# Patient Record
Sex: FEMALE | Race: WHITE | NOT HISPANIC OR LATINO | Employment: FULL TIME | URBAN - METROPOLITAN AREA
[De-identification: names, ages, dates, MRNs, and addresses within clinical notes are randomized per-mention and may not be internally consistent; named-entity substitution may affect disease eponyms.]

---

## 2023-08-08 ENCOUNTER — OFFICE VISIT (OUTPATIENT)
Dept: MEDICAL GROUP | Facility: PHYSICIAN GROUP | Age: 33
End: 2023-08-08
Payer: COMMERCIAL

## 2023-08-08 ENCOUNTER — HOSPITAL ENCOUNTER (OUTPATIENT)
Facility: MEDICAL CENTER | Age: 33
End: 2023-08-08
Payer: COMMERCIAL

## 2023-08-08 VITALS
HEIGHT: 68 IN | WEIGHT: 181 LBS | TEMPERATURE: 98 F | OXYGEN SATURATION: 97 % | BODY MASS INDEX: 27.43 KG/M2 | HEART RATE: 98 BPM | SYSTOLIC BLOOD PRESSURE: 112 MMHG | RESPIRATION RATE: 16 BRPM | DIASTOLIC BLOOD PRESSURE: 74 MMHG

## 2023-08-08 DIAGNOSIS — Z11.3 SCREENING EXAMINATION FOR SEXUALLY TRANSMITTED DISEASE: ICD-10-CM

## 2023-08-08 DIAGNOSIS — Z23 NEED FOR VACCINATION: ICD-10-CM

## 2023-08-08 DIAGNOSIS — Z83.3 FAMILY HISTORY OF DIABETES MELLITUS (DM): ICD-10-CM

## 2023-08-08 DIAGNOSIS — Z78.9 VEGAN DIET: ICD-10-CM

## 2023-08-08 DIAGNOSIS — Z13.0 SCREENING FOR DEFICIENCY ANEMIA: ICD-10-CM

## 2023-08-08 DIAGNOSIS — Z13.6 SCREENING FOR CARDIOVASCULAR CONDITION: ICD-10-CM

## 2023-08-08 DIAGNOSIS — E55.9 VITAMIN D DEFICIENCY: ICD-10-CM

## 2023-08-08 DIAGNOSIS — Z13.29 THYROID DISORDER SCREEN: ICD-10-CM

## 2023-08-08 DIAGNOSIS — Z00.00 WELLNESS EXAMINATION: ICD-10-CM

## 2023-08-08 DIAGNOSIS — F33.0 MILD EPISODE OF RECURRENT MAJOR DEPRESSIVE DISORDER (HCC): ICD-10-CM

## 2023-08-08 PROCEDURE — 90471 IMMUNIZATION ADMIN: CPT

## 2023-08-08 PROCEDURE — 99385 PREV VISIT NEW AGE 18-39: CPT | Mod: 25

## 2023-08-08 PROCEDURE — 90472 IMMUNIZATION ADMIN EACH ADD: CPT

## 2023-08-08 PROCEDURE — 3078F DIAST BP <80 MM HG: CPT

## 2023-08-08 PROCEDURE — 90715 TDAP VACCINE 7 YRS/> IM: CPT

## 2023-08-08 PROCEDURE — 87491 CHLMYD TRACH DNA AMP PROBE: CPT

## 2023-08-08 PROCEDURE — 3074F SYST BP LT 130 MM HG: CPT

## 2023-08-08 PROCEDURE — 90746 HEPB VACCINE 3 DOSE ADULT IM: CPT

## 2023-08-08 PROCEDURE — 87591 N.GONORRHOEAE DNA AMP PROB: CPT

## 2023-08-08 ASSESSMENT — PATIENT HEALTH QUESTIONNAIRE - PHQ9
4. FEELING TIRED OR HAVING LITTLE ENERGY: SEVERAL DAYS
5. POOR APPETITE OR OVEREATING: NOT AT ALL
2. FEELING DOWN, DEPRESSED, IRRITABLE, OR HOPELESS: NOT AT ALL
8. MOVING OR SPEAKING SO SLOWLY THAT OTHER PEOPLE COULD HAVE NOTICED. OR THE OPPOSITE, BEING SO FIGETY OR RESTLESS THAT YOU HAVE BEEN MOVING AROUND A LOT MORE THAN USUAL: NOT AT ALL
SUM OF ALL RESPONSES TO PHQ9 QUESTIONS 1 AND 2: 0
SUM OF ALL RESPONSES TO PHQ QUESTIONS 1-9: 4
9. THOUGHTS THAT YOU WOULD BE BETTER OFF DEAD, OR OF HURTING YOURSELF: NOT AT ALL
6. FEELING BAD ABOUT YOURSELF - OR THAT YOU ARE A FAILURE OR HAVE LET YOURSELF OR YOUR FAMILY DOWN: SEVERAL DAYS
1. LITTLE INTEREST OR PLEASURE IN DOING THINGS: NOT AT ALL
3. TROUBLE FALLING OR STAYING ASLEEP OR SLEEPING TOO MUCH: SEVERAL DAYS
CLINICAL INTERPRETATION OF PHQ2 SCORE: 0
7. TROUBLE CONCENTRATING ON THINGS, SUCH AS READING THE NEWSPAPER OR WATCHING TELEVISION: SEVERAL DAYS

## 2023-08-08 ASSESSMENT — ENCOUNTER SYMPTOMS: DEPRESSION: 1

## 2023-08-08 NOTE — PROGRESS NOTES
"Subjective:     CC: Diagnoses of Wellness examination, Mild episode of recurrent major depressive disorder (HCC), Vegan diet, Thyroid disorder screen, Vitamin D deficiency, Screening for cardiovascular condition, Screening examination for sexually transmitted disease, Screening for deficiency anemia, Family history of diabetes mellitus (DM), and Need for vaccination were pertinent to this visit.    Pt presents today to establish care with me.  She is generally feeling well no health concerns. She was kindly referred to me by her friend. Has not seen a PCP since moving to [place] in 2019.  She is single/dating; working full time at  [employer]    HPI:   Lisa presents today with    Problem   Mild Episode of Recurrent Major Depressive Disorder (Hcc)       Health Maintenance: Completed  Cancer screening:   Cervical cancer screening: will follow up     Infectious disease screening/Immunizaitons  -STI screenin23  Practices safe sex.  -HIV Screenin23  -Immunizaitons:   Influenza: recommend annually  Tetanus: up-to-date: 23  Anticipatory Guidance:  Elicits she is eating a variety of fresh veggies/fruits, No meats,   2x weekly fast food/junk food  Soda: none; water: 2L+  Exercise: recommended 150 minutes/week: walking 20 min daily; hiking once weekly.   Substance Abuse: no  Safe in relationship. Yes/so  Seat belts, bike helmet, gun safety discussed.  Sun protection used.    ROS:  Review of Systems   Psychiatric/Behavioral:  Positive for depression (mild). Negative for suicidal ideas.    All other systems reviewed and are negative.      Objective:     Exam:  /74 (BP Location: Right arm, Patient Position: Sitting, BP Cuff Size: Small adult)   Pulse 98   Temp 36.7 °C (98 °F) (Temporal)   Resp 16   Ht 1.72 m (5' 7.72\")   Wt 82.1 kg (181 lb)   LMP 2023 (Approximate)   SpO2 97%   BMI 27.75 kg/m²  Body mass index is 27.75 kg/m².    Physical Exam  Vitals reviewed.   Constitutional:       " General: She is not in acute distress.     Appearance: Normal appearance. She is well-groomed. She is not ill-appearing.   HENT:      Head: Normocephalic and atraumatic.      Right Ear: Tympanic membrane, ear canal and external ear normal.      Left Ear: Tympanic membrane, ear canal and external ear normal.      Nose: Nose normal.      Mouth/Throat:      Mouth: Mucous membranes are moist.      Pharynx: Oropharynx is clear.   Eyes:      Extraocular Movements: Extraocular movements intact.      Conjunctiva/sclera: Conjunctivae normal.      Pupils: Pupils are equal, round, and reactive to light.   Neck:      Thyroid: No thyromegaly.      Trachea: Trachea normal.   Cardiovascular:      Rate and Rhythm: Normal rate and regular rhythm.      Pulses: Normal pulses.      Heart sounds: Normal heart sounds. No murmur heard.  Pulmonary:      Effort: Pulmonary effort is normal. No respiratory distress.      Breath sounds: Normal breath sounds. No wheezing.   Abdominal:      General: Bowel sounds are normal.   Musculoskeletal:         General: No swelling, tenderness or deformity. Normal range of motion.      Cervical back: Full passive range of motion without pain.   Lymphadenopathy:      Cervical: No cervical adenopathy.   Skin:     General: Skin is warm and dry.      Capillary Refill: Capillary refill takes less than 2 seconds.   Neurological:      General: No focal deficit present.      Mental Status: She is alert and oriented to person, place, and time. Mental status is at baseline.      Cranial Nerves: No cranial nerve deficit.      Sensory: No sensory deficit.      Motor: No weakness.   Psychiatric:         Mood and Affect: Mood normal.         Behavior: Behavior normal.       Assessment & Plan:     33 y.o. female with the following -     Problem List Items Addressed This Visit       Mild episode of recurrent major depressive disorder (HCC)     Chronic, stable. Seeing psychiatry for this. PTSD/Anxiety/depression.    Depression Screening    Little interest or pleasure in doing things?  0 - not at all  Feeling down, depressed , or hopeless? 0 - not at all  Patient Health Questionnaire Score: 0  Interpretation of PHQ-9 Total Score   Score Severity   1-4 Minimal Depression               Other Visit Diagnoses       Wellness examination        Vegan diet        Relevant Orders    IRON/TOTAL IRON BIND    FERRITIN    VITAMIN B12    Thyroid disorder screen        Relevant Orders    TSH    Vitamin D deficiency        Relevant Orders    VITAMIN D,25 HYDROXY (DEFICIENCY)    Screening for cardiovascular condition        Relevant Orders    Comp Metabolic Panel    Lipid Profile    Screening examination for sexually transmitted disease        Relevant Orders    Chlamydia/GC, PCR (Genital/Anal swab)    HIV AG/AB Combo Assay Screening    T.Pallidum AB ABBY (Screening)    Trichomonas Vaginalis by TMA    Hepatitis C Virus Antibody    HEP B Surface Antibody    Hep B Core AB Total    Hep B Surface Antigen    Screening for deficiency anemia        Relevant Orders    CBC WITHOUT DIFFERENTIAL    Family history of diabetes mellitus (DM)        Relevant Orders    HEMOGLOBIN A1C    Need for vaccination        Relevant Orders    Hepatitis B Vaccine Adult 20+    Tdap Vaccine =>8YO IM          Supportive care, differential diagnoses, and indications for immediate follow-up discussed with patient.    Pathogenesis of diagnosis discussed including typical length and natural progression.    Instructed to return to clinic or nearest emergency department for any change in condition, further concerns, or worsening of symptoms.  Patient states understanding of the plan of care and discharge instructions.    Return in about 4 weeks (around 9/5/2023) for Labs, PAP, vaccines.    Please note that this dictation was created using voice recognition software. I have made every reasonable attempt to correct obvious errors, but I expect that there are errors of grammar and  possibly content that I did not discover before finalizing the note.

## 2023-08-08 NOTE — ASSESSMENT & PLAN NOTE
Chronic, stable. Seeing psychiatry for this. PTSD/Anxiety/depression.   Depression Screening    Little interest or pleasure in doing things?  0 - not at all  Feeling down, depressed , or hopeless? 0 - not at all  Patient Health Questionnaire Score: 0  Interpretation of PHQ-9 Total Score   Score Severity   1-4 Minimal Depression

## 2023-08-09 LAB
C TRACH DNA GENITAL QL NAA+PROBE: NEGATIVE
N GONORRHOEA DNA GENITAL QL NAA+PROBE: NEGATIVE
SPECIMEN SOURCE: NORMAL

## 2023-08-30 ENCOUNTER — HOSPITAL ENCOUNTER (OUTPATIENT)
Dept: LAB | Facility: MEDICAL CENTER | Age: 33
End: 2023-08-30
Payer: COMMERCIAL

## 2023-08-30 DIAGNOSIS — Z78.9 VEGAN DIET: ICD-10-CM

## 2023-08-30 DIAGNOSIS — E55.9 VITAMIN D DEFICIENCY: ICD-10-CM

## 2023-08-30 DIAGNOSIS — Z13.29 THYROID DISORDER SCREEN: ICD-10-CM

## 2023-08-30 DIAGNOSIS — Z11.3 SCREENING EXAMINATION FOR SEXUALLY TRANSMITTED DISEASE: ICD-10-CM

## 2023-08-30 DIAGNOSIS — Z13.6 SCREENING FOR CARDIOVASCULAR CONDITION: ICD-10-CM

## 2023-08-30 DIAGNOSIS — Z13.0 SCREENING FOR DEFICIENCY ANEMIA: ICD-10-CM

## 2023-08-30 DIAGNOSIS — Z83.3 FAMILY HISTORY OF DIABETES MELLITUS (DM): ICD-10-CM

## 2023-08-30 LAB
25(OH)D3 SERPL-MCNC: 37 NG/ML (ref 30–100)
ALBUMIN SERPL BCP-MCNC: 4.5 G/DL (ref 3.2–4.9)
ALBUMIN/GLOB SERPL: 1.5 G/DL
ALP SERPL-CCNC: 86 U/L (ref 30–99)
ALT SERPL-CCNC: 12 U/L (ref 2–50)
ANION GAP SERPL CALC-SCNC: 12 MMOL/L (ref 7–16)
AST SERPL-CCNC: 23 U/L (ref 12–45)
BILIRUB SERPL-MCNC: 0.5 MG/DL (ref 0.1–1.5)
BUN SERPL-MCNC: 8 MG/DL (ref 8–22)
CALCIUM ALBUM COR SERPL-MCNC: 9.4 MG/DL (ref 8.5–10.5)
CALCIUM SERPL-MCNC: 9.8 MG/DL (ref 8.5–10.5)
CHLORIDE SERPL-SCNC: 103 MMOL/L (ref 96–112)
CHOLEST SERPL-MCNC: 127 MG/DL (ref 100–199)
CO2 SERPL-SCNC: 21 MMOL/L (ref 20–33)
CREAT SERPL-MCNC: 0.63 MG/DL (ref 0.5–1.4)
ERYTHROCYTE [DISTWIDTH] IN BLOOD BY AUTOMATED COUNT: 44.5 FL (ref 35.9–50)
EST. AVERAGE GLUCOSE BLD GHB EST-MCNC: 103 MG/DL
FERRITIN SERPL-MCNC: 39.1 NG/ML (ref 10–291)
GFR SERPLBLD CREATININE-BSD FMLA CKD-EPI: 120 ML/MIN/1.73 M 2
GLOBULIN SER CALC-MCNC: 3 G/DL (ref 1.9–3.5)
GLUCOSE SERPL-MCNC: 83 MG/DL (ref 65–99)
HBA1C MFR BLD: 5.2 % (ref 4–5.6)
HBV CORE AB SERPL QL IA: NONREACTIVE
HBV SURFACE AB SERPL IA-ACNC: ABNORMAL MIU/ML (ref 0–10)
HBV SURFACE AG SER QL: NORMAL
HCT VFR BLD AUTO: 45.2 % (ref 37–47)
HCV AB SER QL: NORMAL
HDLC SERPL-MCNC: 45 MG/DL
HGB BLD-MCNC: 14.5 G/DL (ref 12–16)
HIV 1+2 AB+HIV1 P24 AG SERPL QL IA: NORMAL
IRON SATN MFR SERPL: 31 % (ref 15–55)
IRON SERPL-MCNC: 97 UG/DL (ref 40–170)
LDLC SERPL CALC-MCNC: 61 MG/DL
MCH RBC QN AUTO: 31.3 PG (ref 27–33)
MCHC RBC AUTO-ENTMCNC: 32.1 G/DL (ref 32.2–35.5)
MCV RBC AUTO: 97.4 FL (ref 81.4–97.8)
PLATELET # BLD AUTO: 243 K/UL (ref 164–446)
PMV BLD AUTO: 10.4 FL (ref 9–12.9)
POTASSIUM SERPL-SCNC: 3.8 MMOL/L (ref 3.6–5.5)
PROT SERPL-MCNC: 7.5 G/DL (ref 6–8.2)
RBC # BLD AUTO: 4.64 M/UL (ref 4.2–5.4)
SODIUM SERPL-SCNC: 136 MMOL/L (ref 135–145)
T PALLIDUM AB SER QL IA: NORMAL
TIBC SERPL-MCNC: 314 UG/DL (ref 250–450)
TRIGL SERPL-MCNC: 104 MG/DL (ref 0–149)
TSH SERPL DL<=0.005 MIU/L-ACNC: 2.33 UIU/ML (ref 0.38–5.33)
UIBC SERPL-MCNC: 217 UG/DL (ref 110–370)
VIT B12 SERPL-MCNC: 2475 PG/ML (ref 211–911)
WBC # BLD AUTO: 7.6 K/UL (ref 4.8–10.8)

## 2023-08-30 PROCEDURE — 83036 HEMOGLOBIN GLYCOSYLATED A1C: CPT

## 2023-08-30 PROCEDURE — 82607 VITAMIN B-12: CPT

## 2023-08-30 PROCEDURE — 86803 HEPATITIS C AB TEST: CPT

## 2023-08-30 PROCEDURE — 82728 ASSAY OF FERRITIN: CPT

## 2023-08-30 PROCEDURE — 82306 VITAMIN D 25 HYDROXY: CPT

## 2023-08-30 PROCEDURE — 87340 HEPATITIS B SURFACE AG IA: CPT

## 2023-08-30 PROCEDURE — 87389 HIV-1 AG W/HIV-1&-2 AB AG IA: CPT

## 2023-08-30 PROCEDURE — 83540 ASSAY OF IRON: CPT

## 2023-08-30 PROCEDURE — 83550 IRON BINDING TEST: CPT

## 2023-08-30 PROCEDURE — 85027 COMPLETE CBC AUTOMATED: CPT

## 2023-08-30 PROCEDURE — 86780 TREPONEMA PALLIDUM: CPT

## 2023-08-30 PROCEDURE — 86706 HEP B SURFACE ANTIBODY: CPT

## 2023-08-30 PROCEDURE — 86704 HEP B CORE ANTIBODY TOTAL: CPT

## 2023-08-30 PROCEDURE — 80061 LIPID PANEL: CPT

## 2023-08-30 PROCEDURE — 80053 COMPREHEN METABOLIC PANEL: CPT

## 2023-08-30 PROCEDURE — 84443 ASSAY THYROID STIM HORMONE: CPT

## 2023-08-30 PROCEDURE — 36415 COLL VENOUS BLD VENIPUNCTURE: CPT

## 2023-09-06 ENCOUNTER — OFFICE VISIT (OUTPATIENT)
Dept: MEDICAL GROUP | Facility: PHYSICIAN GROUP | Age: 33
End: 2023-09-06
Payer: COMMERCIAL

## 2023-09-06 ENCOUNTER — HOSPITAL ENCOUNTER (OUTPATIENT)
Facility: MEDICAL CENTER | Age: 33
End: 2023-09-06
Payer: COMMERCIAL

## 2023-09-06 VITALS
OXYGEN SATURATION: 99 % | BODY MASS INDEX: 27.74 KG/M2 | HEART RATE: 82 BPM | HEIGHT: 68 IN | DIASTOLIC BLOOD PRESSURE: 74 MMHG | TEMPERATURE: 98.5 F | WEIGHT: 183 LBS | SYSTOLIC BLOOD PRESSURE: 110 MMHG

## 2023-09-06 DIAGNOSIS — Z11.51 SCREENING FOR HPV (HUMAN PAPILLOMAVIRUS): ICD-10-CM

## 2023-09-06 DIAGNOSIS — Z12.4 SCREENING FOR CERVICAL CANCER: ICD-10-CM

## 2023-09-06 DIAGNOSIS — Z01.419 ENCOUNTER FOR GYNECOLOGICAL EXAMINATION: ICD-10-CM

## 2023-09-06 DIAGNOSIS — Z23 NEED FOR VACCINATION: ICD-10-CM

## 2023-09-06 DIAGNOSIS — N89.8 VAGINAL LESION: ICD-10-CM

## 2023-09-06 PROCEDURE — 99395 PREV VISIT EST AGE 18-39: CPT | Mod: 25

## 2023-09-06 PROCEDURE — 3078F DIAST BP <80 MM HG: CPT

## 2023-09-06 PROCEDURE — 87798 DETECT AGENT NOS DNA AMP: CPT

## 2023-09-06 PROCEDURE — 87625 HPV TYPES 16 & 18 ONLY: CPT

## 2023-09-06 PROCEDURE — 90471 IMMUNIZATION ADMIN: CPT

## 2023-09-06 PROCEDURE — 3074F SYST BP LT 130 MM HG: CPT

## 2023-09-06 PROCEDURE — 87624 HPV HI-RISK TYP POOLED RSLT: CPT

## 2023-09-06 PROCEDURE — 90746 HEPB VACCINE 3 DOSE ADULT IM: CPT

## 2023-09-06 PROCEDURE — 88175 CYTOPATH C/V AUTO FLUID REDO: CPT

## 2023-09-06 PROCEDURE — 87529 HSV DNA AMP PROBE: CPT | Mod: 91

## 2023-09-06 ASSESSMENT — FIBROSIS 4 INDEX: FIB4 SCORE: 0.87

## 2023-09-06 NOTE — PROGRESS NOTES
Subjective:     CC:   Chief Complaint   Patient presents with    Lab Results    Gynecologic Exam     HPI:   Lisa Champion is a 33 y.o. female who presents for annual exam. She is feeling well and denies any complaints.    Ob-Gyn/ History:    Patient has GYN provider: no  /Para:  0/0  Last Pap Smear:  6 years. no history of abnormal pap smears.  Gyn Surgery:  no.  Current Contraceptive Method:  vasectomy/condom. Is currently sexually active with male and female partner.  Last menstrual period:  2 weeks ago.  Periods regular. moderate bleeding. Cramping is mild.   She does not take OTC analgesics for cramps.  Does experience Bloating prior to period, has noticed bumps near labia x2. No significant fluid retention, pelvic pain, or dyspareunia. No vaginal discharge    Urinary incontinence: no  Folate intake: n/a     Health Maintenance  Diet: vegan, eating a variety of fruits and vegetables daily, [place] of protein sources.  No fast food, junk food, limited sweets and desserts.    Exercise: hiking/walking. Discussed recommendation 150 min/week   Substance Abuse: no   Safe in relationship. Yes  Seat belts, bike helmet, gun safety discussed.  Sun protection used.    Cancer screening  Cervical Cancer Screenin23      Infectious disease screening/Immunizations  --STI Screening: negative  23  --Practices safe sex.  --HIV Screening: negative 23   --Hepatitis C Screening: negative 23   --Immunizations:    Influenza: Recommend annually    HPV: Aged out    Tetanus: Due 2033    Shingles: n/a    Pneumococcal : N/A   COVID-19: Recommended  Other immunizations: hep b     She  has no past medical history on file.  She  has a past surgical history that includes dental extraction(s) and other orthopedic surgery ().    Family History   Problem Relation Age of Onset    No Known Problems Mother     Hyperlipidemia Father     Hypertension Maternal Aunt     Diabetes Maternal Aunt     Diabetes  Paternal Uncle     Diabetes Paternal Uncle     Lung Disease Paternal Grandfather         lung cancer/smoker    Ovarian Cancer Neg Hx     Tubal Cancer Neg Hx     Peritoneal Cancer Neg Hx     Colorectal Cancer Neg Hx     Breast Cancer Neg Hx     Heart Disease Neg Hx     Stroke Neg Hx        Social History     Socioeconomic History    Marital status:      Spouse name: Not on file    Number of children: Not on file    Years of education: Not on file    Highest education level: Not on file   Occupational History    Not on file   Tobacco Use    Smoking status: Never    Smokeless tobacco: Never   Vaping Use    Vaping Use: Never used   Substance and Sexual Activity    Alcohol use: Yes     Comment: occ    Drug use: Never    Sexual activity: Not on file   Other Topics Concern    Not on file   Social History Narrative    Not on file     Social Determinants of Health     Financial Resource Strain: Not on file   Food Insecurity: Not on file   Transportation Needs: Not on file   Physical Activity: Not on file   Stress: Not on file   Social Connections: Not on file   Intimate Partner Violence: Not on file   Housing Stability: Not on file       Patient Active Problem List    Diagnosis Date Noted    Mild episode of recurrent major depressive disorder (HCC) 08/08/2023         No current outpatient medications on file.     No current facility-administered medications for this visit.     No Known Allergies    Review of Systems   Constitutional: Negative for fever, chills and malaise/fatigue.   HENT: Negative for congestion.    Eyes: Negative for pain.    Respiratory: Negative for cough and shortness of breath.  Cardiovascular: Negative for leg swelling.   Gastrointestinal: Negative for nausea, vomiting, abdominal pain and diarrhea.   Genitourinary: Negative for dysuria and hematuria. Has noticed bumps near labia.   Skin: Negative for rash.   Neurological: Negative for dizziness, focal weakness and headaches.  "  Endo/Heme/Allergies: Does not bleed easily.   Psychiatric/Behavioral: Negative for depression.  The patient is not nervous/anxious.      Objective:     /74 (BP Location: Right arm, Patient Position: Sitting, BP Cuff Size: Small adult)   Pulse 82   Temp 36.9 °C (98.5 °F) (Temporal)   Ht 1.72 m (5' 7.72\")   Wt 83 kg (183 lb) Comment: w/ boots  LMP 07/25/2023 (Approximate)   SpO2 99%   BMI 28.06 kg/m²   Body mass index is 28.06 kg/m².  Wt Readings from Last 4 Encounters:   09/06/23 83 kg (183 lb)   08/08/23 82.1 kg (181 lb)       Labs:    Latest Reference Range & Units 08/08/23 09:21 08/30/23 13:35   WBC 4.8 - 10.8 K/uL  7.6   RBC 4.20 - 5.40 M/uL  4.64   Hemoglobin 12.0 - 16.0 g/dL  14.5   Hematocrit 37.0 - 47.0 %  45.2   MCV 81.4 - 97.8 fL  97.4   MCH 27.0 - 33.0 pg  31.3   MCHC 32.2 - 35.5 g/dL  32.1 (L)   RDW 35.9 - 50.0 fL  44.5   Platelet Count 164 - 446 K/uL  243   MPV 9.0 - 12.9 fL  10.4   Sodium 135 - 145 mmol/L  136   Potassium 3.6 - 5.5 mmol/L  3.8   Chloride 96 - 112 mmol/L  103   Co2 20 - 33 mmol/L  21   Anion Gap 7.0 - 16.0   12.0   Glucose 65 - 99 mg/dL  83   Bun 8 - 22 mg/dL  8   Creatinine 0.50 - 1.40 mg/dL  0.63   GFR (CKD-EPI) >60 mL/min/1.73 m 2  120   Calcium 8.5 - 10.5 mg/dL  9.8   Correct Calcium 8.5 - 10.5 mg/dL  9.4   AST(SGOT) 12 - 45 U/L  23   ALT(SGPT) 2 - 50 U/L  12   Alkaline Phosphatase 30 - 99 U/L  86   Total Bilirubin 0.1 - 1.5 mg/dL  0.5   Albumin 3.2 - 4.9 g/dL  4.5   Total Protein 6.0 - 8.2 g/dL  7.5   Globulin 1.9 - 3.5 g/dL  3.0   A-G Ratio g/dL  1.5   Iron 40 - 170 ug/dL  97   Total Iron Binding 250 - 450 ug/dL  314   % Saturation 15 - 55 %  31   Unsat Iron Binding 110 - 370 ug/dL  217   Glycohemoglobin 4.0 - 5.6 %  5.2   Estim. Avg Glu mg/dL  103   Cholesterol,Tot 100 - 199 mg/dL  127   Triglycerides 0 - 149 mg/dL  104   HDL >=40 mg/dL  45   LDL <100 mg/dL  61   25-Hydroxy   Vitamin D 25 30 - 100 ng/mL  37   Ferritin 10.0 - 291.0 ng/mL  39.1   Vitamin B12 -True " Cobalamin 211 - 911 pg/mL  2475 (H)   TSH 0.380 - 5.330 uIU/mL  2.330   Hep B Surface Antibody Quant 0.00 - 10.00 mIU/mL  06445.00 (H)   Hepatitis B Surface Antigen Non-Reactive   Non-Reactive   Hepatitis B Core Ab, Total Non-Reactive   NonReactive   Hepatitis C Antibody Non-Reactive   Non-Reactive   HIV Ag/Ab Combo Assay Non Reactive   Non-Reactive   Syphilis, Treponemal Qual Non-Reactive   Non-Reactive   C. trachomatis by PCR Negative  Negative    N. gonorrhoeae by PCR Negative  Negative    Source  Genital    (L): Data is abnormally low  (H): Data is abnormally high    Physical Exam:  Constitutional: Well-developed and well-nourished. Not diaphoretic. No distress.   Skin: Skin is warm and dry. No rash noted.  Head: Atraumatic without lesions.  Eyes: Conjunctivae and extraocular motions are normal. Pupils are equal, round, and reactive to light. No scleral icterus.   Ears:  External ears unremarkable. Tympanic membranes clear and intact.  Nose: Nares patent. Septum midline. Turbinates without erythema nor edema. No discharge.   Mouth/Throat: Dentition is normal. Tongue normal. Oropharynx is clear and moist.   Neck: Supple, trachea midline. Normal range of motion. No thyromegaly present. No lymphadenopathy--cervical or supraclavicular.  Cardiovascular: Regular rate and rhythm,   Lungs: Normal inspiratory effort,   no wheezes/rhonchi/rales  Abdomen: Soft, non tender, and without distention. Active bowel sounds in all four quadrants. No rebound, guarding, masses or HSM.  :Perineum and external genitalia normal without rash. Vagina with normal and physiologic and white discharge. Cervix without visible lesions or discharge. Bimanual exam without adnexal masses or cervical motion tenderness. 2 0.5cm lumps to 5 o'clock area of vaginal opening, appear approximated together, not painful. Possibly bartholin gland  Extremities: No cyanosis, clubbing, erythema, nor edema. Distal pulses intact and symmetric.    Musculoskeletal: All major joints AROM full in all directions without pain.  Neurological: Alert and oriented x 3. No cranial nerve deficit. 5/5 myotomes. Sensation intact.   Psychiatric:  Behavior, mood, and affect are appropriate.    Assessment and Plan:     Problem List Items Addressed This Visit    None  Visit Diagnoses       Screening for cervical cancer        Relevant Orders    Thinprep Pap with HPV    Encounter for gynecological examination        Relevant Orders    Thinprep Pap with HPV    Screening for HPV (human papillomavirus)        Relevant Orders    Thinprep Pap with HPV    Need for vaccination        Relevant Orders    Hep B Adult 20+    Vaginal lesion        Relevant Orders    HERPES VIRAL CULTURE           HCM:  complete   Labs per orders  Immunizations per orders  Patient counseled about skin care, diet, supplements, prenatal vitamins, safe sex and exercise.    Return in about 1 year (around 9/6/2024), or if symptoms worsen or fail to improve, for wellness.    Please note that this dictation was created using voice recognition software. I have made every reasonable attempt to correct obvious errors, but I expect that there are errors of grammar and possibly content that I did not discover before finalizing the note.

## 2023-09-09 LAB
CYTOLOGIST CVX/VAG CYTO: ABNORMAL
CYTOLOGY CVX/VAG DOC CYTO: ABNORMAL
CYTOLOGY CVX/VAG DOC THIN PREP: ABNORMAL
HPV I/H RISK 4 DNA CVX QL PROBE+SIG AMP: POSITIVE
HPV REFLEX NL1174300: ABNORMAL
HPV16 DNA CVX QL PROBE+SIG AMP: POSITIVE
HPV18+45 E6+E7 MRNA CVX QL NAA+PROBE: NEGATIVE
HSV1 DNA CSF QL NAA+PROBE: NOT DETECTED
HSV2 DNA CSF QL NAA+PROBE: NOT DETECTED
NOTE NL11727A: ABNORMAL
OTHER STN SPEC: ABNORMAL
SPECIMEN SOURCE: NORMAL
STAT OF ADQ CVX/VAG CYTO-IMP: ABNORMAL

## 2023-09-10 LAB
SPECIMEN SOURCE: NORMAL
VZV DNA SPEC QL NAA+PROBE: NOT DETECTED

## 2023-09-11 DIAGNOSIS — R87.810 CERVICAL HIGH RISK HUMAN PAPILLOMAVIRUS (HPV) DNA TEST POSITIVE: ICD-10-CM

## 2023-10-18 ENCOUNTER — GYNECOLOGY VISIT (OUTPATIENT)
Dept: OBGYN | Facility: CLINIC | Age: 33
End: 2023-10-18
Payer: COMMERCIAL

## 2023-10-18 VITALS
DIASTOLIC BLOOD PRESSURE: 84 MMHG | WEIGHT: 179 LBS | HEIGHT: 68 IN | BODY MASS INDEX: 27.13 KG/M2 | SYSTOLIC BLOOD PRESSURE: 129 MMHG

## 2023-10-18 DIAGNOSIS — R87.810 PAPANICOLAOU SMEAR OF CERVIX WITH POSITIVE HIGH RISK HUMAN PAPILLOMA VIRUS (HPV) TEST: ICD-10-CM

## 2023-10-18 PROCEDURE — 3079F DIAST BP 80-89 MM HG: CPT | Performed by: PHYSICIAN ASSISTANT

## 2023-10-18 PROCEDURE — 3074F SYST BP LT 130 MM HG: CPT | Performed by: PHYSICIAN ASSISTANT

## 2023-10-18 PROCEDURE — 99203 OFFICE O/P NEW LOW 30 MIN: CPT | Performed by: PHYSICIAN ASSISTANT

## 2023-10-18 ASSESSMENT — FIBROSIS 4 INDEX: FIB4 SCORE: 0.87

## 2023-10-18 NOTE — PROGRESS NOTES
ANNUAL GYNECOLOGY VISIT    Chief Complaint  Annual    Subjective  iLsa Champion is a 33 y.o. female  Patient's last menstrual period was 10/13/2023 (exact date) using nothing for contraception who presents today for Annual Exam.  Pt in for abnormal Pap results per PCP done 23 with NILM but positive HPV 16. No hx of abnormal Paps previously. Has been doing routine somewhat Paps with last Pap 2019. Pt is sexually active with multiple partners both male and female.       Preventive Care   Immunization History   Administered Date(s) Administered    Hepatitis B Vaccine (Adol/Adult) 2023, 2023    Tdap Vaccine 2023       Guardasil HPV vaccine: due    Gynecology History and ROS  Current Sexual Activity: yes - multiple partners male and female   History of sexually transmitted diseases? no  Abnormal discharge? no  Current Contraception: nothing, declines     Menstrual History  Patient's last menstrual period was 10/13/2023 (exact date).  Periods are regular  q 28 days, lasting 4-5 days.   Clots or heavy flow: no  Dysmenorrhea: no  Intermenstrual bleeding/spotting: no  Significant pain with periods:no  Bothersome PMS symptoms: no  Significant Pelvic Pain: no    Pap History  Last pap smear: 2023  History of moderate or severe dysplasia: no    Cancer Risk Assessement:  Family history of:   - Breast cancer: no   - Ovarian cancer: no   - Uterine cancer: no   - Colon cancer: no    Obstetric History  OB History    Para Term  AB Living   0 0 0 0 0 0   SAB IAB Ectopic Molar Multiple Live Births   0 0 0 0 0 0       Past Medical History  History reviewed. No pertinent past medical history.    Past Surgical History  Past Surgical History:   Procedure Laterality Date    OTHER ORTHOPEDIC SURGERY      hip dysplasia surgery    DENTAL EXTRACTION(S)      wisdom teeth       Social History  Social History     Tobacco Use    Smoking status: Never    Smokeless tobacco: Never   Vaping Use  "   Vaping Use: Never used   Substance Use Topics    Alcohol use: Yes     Comment: occ    Drug use: Never        Family History  Family History   Problem Relation Age of Onset    No Known Problems Mother     Hyperlipidemia Father     Hypertension Maternal Aunt     Diabetes Maternal Aunt     Diabetes Paternal Uncle     Diabetes Paternal Uncle     Lung Disease Paternal Grandfather         lung cancer/smoker    Ovarian Cancer Neg Hx     Tubal Cancer Neg Hx     Peritoneal Cancer Neg Hx     Colorectal Cancer Neg Hx     Breast Cancer Neg Hx     Heart Disease Neg Hx     Stroke Neg Hx        Home Medications  No current outpatient medications on file.       Allergies/Reactions  No Known Allergies    ROS  Positive ROS: see HPI  Gen: no fevers or chills, no significant weight loss or gain, excessive fatigue  Respiratory:  no cough or dyspnea  Cardiac:  no chest pain, no palpitations, no syncope  Breast: no breast discharge, pain, lump or skin changes  GI:  no heartburn, no abdominal pain, no nausea or vomiting  Urinary: no dysuria, urgency, frequency, incontinence   Psych: no depression or anxiety  Neuro: no migraines with aura, fainting spells, numbness or tingling  Extremities: no joint pain, persistently swollen ankles, recurrent leg cramps      Physical Examination:  Vital Signs: /84   Ht 5' 8\"   Wt 179 lb   LMP 10/13/2023 (Exact Date)   BMI 27.22 kg/m²       Constitutional: The patient is well developed and well nourished.  Psychiatric: Patient is oriented to time place and person.   Skin: No rash observed.  Neck: Appears symmetric. Thyroid normal size  Respiratory: normal effort  Breast: deferred  Abdomen: Soft, non-tender.  Pelvic Exam: deferred  Extremeties: Legs are symmetric and without tenderness. There is no edema present.        Assessment & Plan  Lisa Champion is a 33 y.o. female who presents today for Annual Gyn Exam.     1. Papanicolaou smear of cervix with positive high risk human papilloma " virus (HPV) test    - Reviewed Pap results with pt of NILM but positive HPV for high risk type 16. Discussed cervical cancer, progression, and pathology. Reviewed ASCCP recommendations for colposcopy. Discussed procedure with pt and possible outcomes and monitoring vs treatment if needed. Questions answers.  Pt will schedule colpo.           Return: for colposcopy     Laure Oshea P.A.-C.

## 2023-12-13 ENCOUNTER — GYNECOLOGY VISIT (OUTPATIENT)
Dept: OBGYN | Facility: CLINIC | Age: 33
End: 2023-12-13
Payer: COMMERCIAL

## 2023-12-13 ENCOUNTER — HOSPITAL ENCOUNTER (OUTPATIENT)
Facility: MEDICAL CENTER | Age: 33
End: 2023-12-13
Attending: OBSTETRICS & GYNECOLOGY
Payer: COMMERCIAL

## 2023-12-13 VITALS — WEIGHT: 181 LBS | BODY MASS INDEX: 27.52 KG/M2 | SYSTOLIC BLOOD PRESSURE: 123 MMHG | DIASTOLIC BLOOD PRESSURE: 78 MMHG

## 2023-12-13 DIAGNOSIS — R87.810 PAPANICOLAOU SMEAR OF CERVIX WITH POSITIVE HIGH RISK HUMAN PAPILLOMA VIRUS (HPV) TEST: Primary | ICD-10-CM

## 2023-12-13 DIAGNOSIS — Z71.85 HPV VACCINE COUNSELING: ICD-10-CM

## 2023-12-13 LAB
AMBIGUOUS DTTM AMBI4: NORMAL
PATHOLOGY CONSULT NOTE: NORMAL

## 2023-12-13 PROCEDURE — 57455 BIOPSY OF CERVIX W/SCOPE: CPT | Performed by: OBSTETRICS & GYNECOLOGY

## 2023-12-13 PROCEDURE — 88305 TISSUE EXAM BY PATHOLOGIST: CPT

## 2023-12-13 PROCEDURE — 3078F DIAST BP <80 MM HG: CPT | Performed by: OBSTETRICS & GYNECOLOGY

## 2023-12-13 PROCEDURE — 3074F SYST BP LT 130 MM HG: CPT | Performed by: OBSTETRICS & GYNECOLOGY

## 2023-12-13 ASSESSMENT — FIBROSIS 4 INDEX: FIB4 SCORE: 0.87

## 2023-12-13 NOTE — PROGRESS NOTES
"COLPOSCOPY PROCEDURE NOTE:  Lisa Champion is a 33 y.o. female presents today for colposcopy evaluation for history of normal pap with +HPV 16 result.  This was her first abnormal pap or +HPV test    /78 (BP Location: Left arm, Patient Position: Sitting, BP Cuff Size: Adult)   Wt 181 lb   LMP 12/08/2023 (Exact Date)   BMI 27.52 kg/m²     PROCEDURE:  Indication: HPV type 16  Adequacy: colposcopy adequate.  SCJ Visibility: completely visible  TZ Zone Type: 1  Procedure: cervix swabbed with acetic acid solution. Large/course acetowhite plaque across anterior cervical transformation zone and otherwise \"rim\" of acteowhite plaque surrounding remainder of TZ.   Bx taken 11/12 and 9:00 (of the rim).  ECC not indicated.  Hemostasis achieved with Monsel's.    Findings:  Physical Exam  Genitourinary:                ASSESSMENT:   HPV related changes; possibly moderate-severe dysplasia    PLAN:   specimens labelled and sent to Pathology, will base further treatment on Pathology findings, and post biopsy instructions given to patient    Cornelia Braden D.O.    "

## 2023-12-15 ENCOUNTER — PATIENT MESSAGE (OUTPATIENT)
Dept: OBGYN | Facility: CLINIC | Age: 33
End: 2023-12-15
Payer: COMMERCIAL

## 2023-12-28 ENCOUNTER — TELEPHONE (OUTPATIENT)
Dept: OBGYN | Facility: CLINIC | Age: 33
End: 2023-12-28
Payer: COMMERCIAL

## 2024-02-01 ENCOUNTER — GYNECOLOGY VISIT (OUTPATIENT)
Dept: OBGYN | Facility: CLINIC | Age: 34
End: 2024-02-01
Payer: COMMERCIAL

## 2024-02-01 ENCOUNTER — HOSPITAL ENCOUNTER (OUTPATIENT)
Facility: MEDICAL CENTER | Age: 34
End: 2024-02-01
Attending: OBSTETRICS & GYNECOLOGY
Payer: COMMERCIAL

## 2024-02-01 DIAGNOSIS — D06.9 HIGH GRADE SQUAMOUS INTRAEPITHELIAL LESION (HGSIL), GRADE 3 CIN, ON BIOPSY OF CERVIX: ICD-10-CM

## 2024-02-01 DIAGNOSIS — D06.9 HIGH GRADE SQUAMOUS INTRAEPITHELIAL LESION (HGSIL), GRADE 3 CIN, ON BIOPSY OF CERVIX: Primary | ICD-10-CM

## 2024-02-01 LAB
PATHOLOGY CONSULT NOTE: NORMAL
POCT INT CON NEG: NEGATIVE
POCT INT CON POS: POSITIVE
POCT URINE PREGNANCY TEST: NEGATIVE

## 2024-02-01 PROCEDURE — 88307 TISSUE EXAM BY PATHOLOGIST: CPT

## 2024-02-01 PROCEDURE — 88305 TISSUE EXAM BY PATHOLOGIST: CPT

## 2024-02-01 PROCEDURE — 81025 URINE PREGNANCY TEST: CPT | Performed by: OBSTETRICS & GYNECOLOGY

## 2024-02-01 PROCEDURE — 57522 CONIZATION OF CERVIX: CPT | Performed by: OBSTETRICS & GYNECOLOGY
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
## 2024-08-08 DIAGNOSIS — Z13.29 THYROID DISORDER SCREEN: ICD-10-CM

## 2024-08-08 DIAGNOSIS — Z13.0 SCREENING FOR DEFICIENCY ANEMIA: ICD-10-CM

## 2024-08-08 DIAGNOSIS — E55.9 VITAMIN D DEFICIENCY: ICD-10-CM

## 2024-08-08 DIAGNOSIS — Z83.3 FAMILY HISTORY OF DIABETES MELLITUS (DM): ICD-10-CM

## 2024-08-08 DIAGNOSIS — Z13.6 SCREENING FOR CARDIOVASCULAR CONDITION: ICD-10-CM

## 2024-12-05 ENCOUNTER — HOSPITAL ENCOUNTER (OUTPATIENT)
Facility: MEDICAL CENTER | Age: 34
End: 2024-12-05
Payer: COMMERCIAL

## 2024-12-05 DIAGNOSIS — Z13.0 SCREENING FOR DEFICIENCY ANEMIA: ICD-10-CM

## 2024-12-05 DIAGNOSIS — Z13.6 SCREENING FOR CARDIOVASCULAR CONDITION: ICD-10-CM

## 2024-12-05 DIAGNOSIS — Z13.29 THYROID DISORDER SCREEN: ICD-10-CM

## 2024-12-05 DIAGNOSIS — E55.9 VITAMIN D DEFICIENCY: ICD-10-CM

## 2024-12-05 DIAGNOSIS — Z83.3 FAMILY HISTORY OF DIABETES MELLITUS (DM): ICD-10-CM

## 2024-12-05 LAB
ERYTHROCYTE [DISTWIDTH] IN BLOOD BY AUTOMATED COUNT: 38.4 FL (ref 35.9–50)
HCT VFR BLD AUTO: 43.5 % (ref 37–47)
HGB BLD-MCNC: 14.6 G/DL (ref 12–16)
MCH RBC QN AUTO: 30.9 PG (ref 27–33)
MCHC RBC AUTO-ENTMCNC: 33.6 G/DL (ref 32.2–35.5)
MCV RBC AUTO: 92.2 FL (ref 81.4–97.8)
PLATELET # BLD AUTO: 245 K/UL (ref 164–446)
PMV BLD AUTO: 10.1 FL (ref 9–12.9)
RBC # BLD AUTO: 4.72 M/UL (ref 4.2–5.4)
WBC # BLD AUTO: 6.8 K/UL (ref 4.8–10.8)

## 2024-12-05 PROCEDURE — 82306 VITAMIN D 25 HYDROXY: CPT

## 2024-12-05 PROCEDURE — 80053 COMPREHEN METABOLIC PANEL: CPT

## 2024-12-05 PROCEDURE — 85027 COMPLETE CBC AUTOMATED: CPT

## 2024-12-05 PROCEDURE — 80061 LIPID PANEL: CPT

## 2024-12-05 PROCEDURE — 36415 COLL VENOUS BLD VENIPUNCTURE: CPT

## 2024-12-05 PROCEDURE — 84443 ASSAY THYROID STIM HORMONE: CPT

## 2024-12-05 PROCEDURE — 83036 HEMOGLOBIN GLYCOSYLATED A1C: CPT

## 2024-12-06 LAB
25(OH)D3 SERPL-MCNC: 29 NG/ML (ref 30–100)
ALBUMIN SERPL BCP-MCNC: 4.4 G/DL (ref 3.2–4.9)
ALBUMIN/GLOB SERPL: 1.3 G/DL
ALP SERPL-CCNC: 91 U/L (ref 30–99)
ALT SERPL-CCNC: 16 U/L (ref 2–50)
ANION GAP SERPL CALC-SCNC: 11 MMOL/L (ref 7–16)
AST SERPL-CCNC: 23 U/L (ref 12–45)
BILIRUB SERPL-MCNC: 0.4 MG/DL (ref 0.1–1.5)
BUN SERPL-MCNC: 9 MG/DL (ref 8–22)
CALCIUM ALBUM COR SERPL-MCNC: 9 MG/DL (ref 8.5–10.5)
CALCIUM SERPL-MCNC: 9.3 MG/DL (ref 8.5–10.5)
CHLORIDE SERPL-SCNC: 103 MMOL/L (ref 96–112)
CHOLEST SERPL-MCNC: 145 MG/DL (ref 100–199)
CO2 SERPL-SCNC: 21 MMOL/L (ref 20–33)
CREAT SERPL-MCNC: 0.79 MG/DL (ref 0.5–1.4)
GFR SERPLBLD CREATININE-BSD FMLA CKD-EPI: 101 ML/MIN/1.73 M 2
GLOBULIN SER CALC-MCNC: 3.3 G/DL (ref 1.9–3.5)
GLUCOSE SERPL-MCNC: 91 MG/DL (ref 65–99)
HDLC SERPL-MCNC: 49 MG/DL
LDLC SERPL CALC-MCNC: 66 MG/DL
POTASSIUM SERPL-SCNC: 4.7 MMOL/L (ref 3.6–5.5)
PROT SERPL-MCNC: 7.7 G/DL (ref 6–8.2)
SODIUM SERPL-SCNC: 135 MMOL/L (ref 135–145)
TRIGL SERPL-MCNC: 150 MG/DL (ref 0–149)
TSH SERPL-ACNC: 3.26 UIU/ML (ref 0.35–5.5)

## 2024-12-09 LAB
EST. AVERAGE GLUCOSE BLD GHB EST-MCNC: 108 MG/DL
HBA1C MFR BLD: 5.4 % (ref 4–5.6)

## 2024-12-11 ENCOUNTER — HOSPITAL ENCOUNTER (OUTPATIENT)
Facility: MEDICAL CENTER | Age: 34
End: 2024-12-11
Payer: COMMERCIAL

## 2024-12-11 ENCOUNTER — APPOINTMENT (OUTPATIENT)
Dept: MEDICAL GROUP | Facility: PHYSICIAN GROUP | Age: 34
End: 2024-12-11
Payer: COMMERCIAL

## 2024-12-11 VITALS
RESPIRATION RATE: 20 BRPM | DIASTOLIC BLOOD PRESSURE: 80 MMHG | BODY MASS INDEX: 28.49 KG/M2 | WEIGHT: 188 LBS | SYSTOLIC BLOOD PRESSURE: 110 MMHG | TEMPERATURE: 98.5 F | HEART RATE: 96 BPM | OXYGEN SATURATION: 100 % | HEIGHT: 68 IN

## 2024-12-11 DIAGNOSIS — Z01.419 WELL WOMAN EXAM WITH ROUTINE GYNECOLOGICAL EXAM: ICD-10-CM

## 2024-12-11 DIAGNOSIS — Z23 NEED FOR VACCINATION: ICD-10-CM

## 2024-12-11 DIAGNOSIS — Z12.4 SCREENING FOR CERVICAL CANCER: ICD-10-CM

## 2024-12-11 PROCEDURE — 87624 HPV HI-RISK TYP POOLED RSLT: CPT

## 2024-12-11 PROCEDURE — 99395 PREV VISIT EST AGE 18-39: CPT | Mod: 25

## 2024-12-11 PROCEDURE — 99459 PELVIC EXAMINATION: CPT

## 2024-12-11 PROCEDURE — 90472 IMMUNIZATION ADMIN EACH ADD: CPT

## 2024-12-11 PROCEDURE — 90716 VAR VACCINE LIVE SUBQ: CPT

## 2024-12-11 PROCEDURE — 90471 IMMUNIZATION ADMIN: CPT

## 2024-12-11 PROCEDURE — 88142 CYTOPATH C/V THIN LAYER: CPT

## 2024-12-11 PROCEDURE — 3079F DIAST BP 80-89 MM HG: CPT

## 2024-12-11 PROCEDURE — 3074F SYST BP LT 130 MM HG: CPT

## 2024-12-11 PROCEDURE — 99000 SPECIMEN HANDLING OFFICE-LAB: CPT

## 2024-12-11 PROCEDURE — 90656 IIV3 VACC NO PRSV 0.5 ML IM: CPT

## 2024-12-11 PROCEDURE — 90746 HEPB VACCINE 3 DOSE ADULT IM: CPT

## 2024-12-11 ASSESSMENT — PATIENT HEALTH QUESTIONNAIRE - PHQ9: CLINICAL INTERPRETATION OF PHQ2 SCORE: 0

## 2024-12-11 ASSESSMENT — FIBROSIS 4 INDEX: FIB4 SCORE: 0.77

## 2024-12-11 NOTE — PROGRESS NOTES
Subjective:     CC:   Chief Complaint   Patient presents with    Annual Wellness Visit     pap       HPI:   iLsa Champion is a 34 y.o. female who presents for annual exam. She is feeling well and denies any complaints.    Ob-Gyn/ History:    Patient has GYN provider: no; saw Braden for abnormal pap follow up, would like to go to a different clinic if follow up needed.  /Para:  0/0  Last Pap Smear:  23. + history of abnormal pap smears.  Gyn Surgery:  leep 24.  Current Contraceptive Method:  none, partner vasectomy. is currently sexually active.  Last menstrual period:  1 month ago.  Periods regular. varied bleeding. Cramping is variable.   She does take OTC analgesics for cramps.  No significant fluid retention, pelvic pain. No vaginal discharge; reports bloating and pain during and after intercourse  Urinary incontinence: no    Health Maintenance  Anticipatory Guidance  Health Maintenance  Diet: variety of fresh veggies/fruits, NO meats, limited fast food/junk food/soda  Exercise: recommended 150 minutes/week; has gym membership- not using currently. Enjoys hiking/walking  Substance Abuse: no  Safe in relationship. Yes/s.o.  Seat belts, bike helmet, gun safety discussed.  Sun protection used.  Dental Home.    Cancer screening  Cervical Cancer Screenin24     Infectious disease screening/Immunizations  --STI Screening: declines   --Practices safe sex.  --HIV Screenin23 negative   --Hepatitis C Screening: negative 23   --Immunizations:    Influenza: 24    Tetanus: 34    Shingles: n/a    Pneumococcal : n/a         Hepatitis B: 3/3 12/11/24  COVID-19: recommended  Other immunizations: chickenpox 24     She  has no past medical history on file.  She  has a past surgical history that includes dental extraction(s) and other orthopedic surgery ().    Family History   Problem Relation Age of Onset    No Known Problems Mother     Hyperlipidemia Father      Hypertension Maternal Aunt     Diabetes Maternal Aunt     Diabetes Paternal Uncle     Diabetes Paternal Uncle     Lung Disease Paternal Grandfather         lung cancer/smoker    Ovarian Cancer Neg Hx     Tubal Cancer Neg Hx     Peritoneal Cancer Neg Hx     Colorectal Cancer Neg Hx     Breast Cancer Neg Hx     Heart Disease Neg Hx     Stroke Neg Hx        Social History     Socioeconomic History    Marital status:      Spouse name: Not on file    Number of children: Not on file    Years of education: Not on file    Highest education level: Not on file   Occupational History    Not on file   Tobacco Use    Smoking status: Never    Smokeless tobacco: Never   Vaping Use    Vaping status: Never Used   Substance and Sexual Activity    Alcohol use: Yes     Comment: occ    Drug use: Never    Sexual activity: Yes     Partners: Male, Female     Birth control/protection: Condom   Other Topics Concern    Not on file   Social History Narrative    Not on file     Social Drivers of Health     Financial Resource Strain: Not on file   Food Insecurity: Not on file   Transportation Needs: Not on file   Physical Activity: Not on file   Stress: Not on file   Social Connections: Not on file   Intimate Partner Violence: Not on file   Housing Stability: Not on file       Patient Active Problem List    Diagnosis Date Noted    Well woman exam with routine gynecological exam 12/11/2024    High grade squamous intraepithelial lesion (HGSIL), grade 3 SILVIANO, on biopsy of cervix 02/01/2024    Papanicolaou smear of cervix with positive high risk human papilloma virus (HPV) test 12/13/2023    Mild episode of recurrent major depressive disorder (HCC) 08/08/2023         No current outpatient medications on file.     No current facility-administered medications for this visit.     No Known Allergies    Review of Systems   Constitutional: Negative for fever, chills and malaise/fatigue.   HENT: Negative for congestion.    Eyes: Negative for pain.   "  Respiratory: Negative for cough and shortness of breath.  Cardiovascular: Negative for leg swelling.   Gastrointestinal: Negative for nausea, vomiting, abdominal pain and diarrhea.   Genitourinary: Negative for dysuria and hematuria. Reports some bloating and discomfort with intercourse, pain afterward.  Skin: Negative for rash.   Neurological: Negative for dizziness, focal weakness and headaches.   Endo/Heme/Allergies: Does not bleed easily.   Psychiatric/Behavioral: Negative for depression.  The patient is not nervous/anxious.      Objective:     /80 (BP Location: Right arm, Patient Position: Sitting, BP Cuff Size: Adult)   Pulse 96   Temp 36.9 °C (98.5 °F) (Temporal)   Resp 20   Ht 1.727 m (5' 8\")   Wt 85.3 kg (188 lb)   SpO2 100%   BMI 28.59 kg/m²   Body mass index is 28.59 kg/m².  Wt Readings from Last 4 Encounters:   12/11/24 85.3 kg (188 lb)   12/13/23 82.1 kg (181 lb)   10/18/23 81.2 kg (179 lb)   09/06/23 83 kg (183 lb)       Labs:    Latest Reference Range & Units 12/05/24 08:32   WBC 4.8 - 10.8 K/uL 6.8   RBC 4.20 - 5.40 M/uL 4.72   Hemoglobin 12.0 - 16.0 g/dL 14.6   Hematocrit 37.0 - 47.0 % 43.5   MCV 81.4 - 97.8 fL 92.2   MCH 27.0 - 33.0 pg 30.9   MCHC 32.2 - 35.5 g/dL 33.6   RDW 35.9 - 50.0 fL 38.4   Platelet Count 164 - 446 K/uL 245   MPV 9.0 - 12.9 fL 10.1   Sodium 135 - 145 mmol/L 135   Potassium 3.6 - 5.5 mmol/L 4.7   Chloride 96 - 112 mmol/L 103   Co2 20 - 33 mmol/L 21   Anion Gap 7.0 - 16.0  11.0   Glucose 65 - 99 mg/dL 91   Bun 8 - 22 mg/dL 9   Creatinine 0.50 - 1.40 mg/dL 0.79   GFR (CKD-EPI) >60 mL/min/1.73 m 2 101   Calcium 8.5 - 10.5 mg/dL 9.3   Correct Calcium 8.5 - 10.5 mg/dL 9.0   AST(SGOT) 12 - 45 U/L 23   ALT(SGPT) 2 - 50 U/L 16   Alkaline Phosphatase 30 - 99 U/L 91   Total Bilirubin 0.1 - 1.5 mg/dL 0.4   Albumin 3.2 - 4.9 g/dL 4.4   Total Protein 6.0 - 8.2 g/dL 7.7   Globulin 1.9 - 3.5 g/dL 3.3   A-G Ratio g/dL 1.3   Glycohemoglobin 4.0 - 5.6 % 5.4   Estim. Avg Glu mg/dL " 108   Cholesterol,Tot 100 - 199 mg/dL 145   Triglycerides 0 - 149 mg/dL 150 (H)   HDL >=40 mg/dL 49   LDL <100 mg/dL 66   25-Hydroxy   Vitamin D 25 30 - 100 ng/mL 29 (L)   TSH 0.350 - 5.500 uIU/mL 3.260   (H): Data is abnormally high  (L): Data is abnormally low     Physical Exam:  Constitutional: Well-developed and well-nourished. Not diaphoretic. No distress.   Skin: Skin is warm and dry. No rash noted.  Head: Atraumatic without lesions.  Eyes: Conjunctivae and extraocular motions are normal. Pupils are equal, round, and reactive to light. No scleral icterus.   Ears:  External ears unremarkable. Tympanic membranes clear and intact.  Nose: Nares patent. Septum midline. Turbinates without erythema nor edema. No discharge.   Mouth/Throat: Dentition is normal. Tongue normal. Oropharynx is clear and moist. Posterior pharynx without erythema or exudates.  Neck: Supple, trachea midline. Normal range of motion. No thyromegaly present. No lymphadenopathy--cervical or supraclavicular.  Cardiovascular: Regular rate and rhythm, S1 and S2 without murmur, rubs, or gallops.  Lungs: Normal inspiratory effort, CTA bilaterally, no wheezes/rhonchi/rales  Abdomen: Soft, non tender, and without distention. Active bowel sounds in all four quadrants. No rebound, guarding, masses or HSM.  :Perineum and external genitalia normal without rash. Vagina with scant discharge. Cervix without visible lesions or discharge. Bimanual exam without adnexal masses or cervical motion tenderness.Physical Exam  Genitourinary:     Vagina: Tenderness present.      Rectum: Normal.                Comments: Cervix with folded, redundant tissue, os with asymmetry    Two raised lumps to left labia minora, no tenderness or erythema        Extremities: No cyanosis, clubbing, erythema, nor edema. Distal pulses intact and symmetric.   Musculoskeletal: All major joints AROM full in all directions without pain.  Neurological: Alert and oriented x 3. No cranial  nerve deficit. 5/5 myotomes. Sensation intact.   Psychiatric:  pt was tearful, expressed trauma from LEEP procedure, is anxious and worried about result and findings, prior findings    A chaperone was offered to the patient during today's exam.: Chaperone Jered rocha ma was present.    Assessment and Plan:     Problem List Items Addressed This Visit          Family Medicine Problems    Well woman exam with routine gynecological exam     Other Visit Diagnoses       Need for vaccination        Relevant Orders    INFLUENZA VACCINE TRI INJ (PF) (Completed)    Varicella Vaccine SQ (Completed)    Hepatitis B Vaccine Adult 20+ (Completed)    Screening for cervical cancer        Relevant Orders    THINPREP PAP WITH HPV              HCM:     Labs per orders  Immunizations per orders  Patient counseled about skin care, diet, supplements, vitamins, safe sex and exercise.    Follow-up: Return in about 3 months (around 3/11/2025), or if symptoms worsen or fail to improve.

## 2024-12-17 LAB
HPV I/H RISK 1 DNA SPEC QL NAA+PROBE: NOT DETECTED
SPECIMEN SOURCE: NORMAL
THINPREP PAP, CYTOLOGY NL11781: NORMAL

## 2025-03-11 ENCOUNTER — OFFICE VISIT (OUTPATIENT)
Dept: MEDICAL GROUP | Facility: PHYSICIAN GROUP | Age: 35
End: 2025-03-11
Payer: COMMERCIAL

## 2025-03-11 VITALS
BODY MASS INDEX: 28.04 KG/M2 | OXYGEN SATURATION: 97 % | DIASTOLIC BLOOD PRESSURE: 62 MMHG | HEART RATE: 88 BPM | HEIGHT: 68 IN | TEMPERATURE: 98.2 F | WEIGHT: 185 LBS | RESPIRATION RATE: 20 BRPM | SYSTOLIC BLOOD PRESSURE: 118 MMHG

## 2025-03-11 DIAGNOSIS — N39.3 STRESS INCONTINENCE OF URINE: ICD-10-CM

## 2025-03-11 DIAGNOSIS — Z23 NEED FOR VACCINATION: ICD-10-CM

## 2025-03-11 DIAGNOSIS — N94.6 PAINFUL MENSTRUAL PERIODS: ICD-10-CM

## 2025-03-11 DIAGNOSIS — D06.9 HIGH GRADE SQUAMOUS INTRAEPITHELIAL LESION (HGSIL), GRADE 3 CIN, ON BIOPSY OF CERVIX: ICD-10-CM

## 2025-03-11 DIAGNOSIS — R42 DIZZINESS: ICD-10-CM

## 2025-03-11 DIAGNOSIS — R87.810 PAPANICOLAOU SMEAR OF CERVIX WITH POSITIVE HIGH RISK HUMAN PAPILLOMA VIRUS (HPV) TEST: ICD-10-CM

## 2025-03-11 DIAGNOSIS — N92.6 ABNORMAL MENSES: ICD-10-CM

## 2025-03-11 PROCEDURE — 3074F SYST BP LT 130 MM HG: CPT

## 2025-03-11 PROCEDURE — 90716 VAR VACCINE LIVE SUBQ: CPT

## 2025-03-11 PROCEDURE — 3078F DIAST BP <80 MM HG: CPT

## 2025-03-11 PROCEDURE — 90471 IMMUNIZATION ADMIN: CPT

## 2025-03-11 PROCEDURE — 99214 OFFICE O/P EST MOD 30 MIN: CPT | Mod: 25

## 2025-03-11 RX ORDER — MECLIZINE HCL 12.5 MG 12.5 MG/1
12.5 TABLET ORAL 3 TIMES DAILY PRN
Qty: 30 TABLET | Refills: 0 | Status: SHIPPED | OUTPATIENT
Start: 2025-03-11

## 2025-03-11 ASSESSMENT — PATIENT HEALTH QUESTIONNAIRE - PHQ9
7. TROUBLE CONCENTRATING ON THINGS, SUCH AS READING THE NEWSPAPER OR WATCHING TELEVISION: NOT AT ALL
5. POOR APPETITE OR OVEREATING: NOT AT ALL
6. FEELING BAD ABOUT YOURSELF - OR THAT YOU ARE A FAILURE OR HAVE LET YOURSELF OR YOUR FAMILY DOWN: NOT AL ALL
4. FEELING TIRED OR HAVING LITTLE ENERGY: NOT AT ALL
2. FEELING DOWN, DEPRESSED, IRRITABLE, OR HOPELESS: NOT AT ALL
3. TROUBLE FALLING OR STAYING ASLEEP OR SLEEPING TOO MUCH: NOT AT ALL
9. THOUGHTS THAT YOU WOULD BE BETTER OFF DEAD, OR OF HURTING YOURSELF: NOT AT ALL
1. LITTLE INTEREST OR PLEASURE IN DOING THINGS: NOT AT ALL
8. MOVING OR SPEAKING SO SLOWLY THAT OTHER PEOPLE COULD HAVE NOTICED. OR THE OPPOSITE, BEING SO FIGETY OR RESTLESS THAT YOU HAVE BEEN MOVING AROUND A LOT MORE THAN USUAL: NOT AT ALL
SUM OF ALL RESPONSES TO PHQ9 QUESTIONS 1 AND 2: 0
SUM OF ALL RESPONSES TO PHQ QUESTIONS 1-9: 0

## 2025-03-11 ASSESSMENT — ENCOUNTER SYMPTOMS
ABDOMINAL PAIN: 1
DIZZINESS: 1

## 2025-03-11 ASSESSMENT — FIBROSIS 4 INDEX: FIB4 SCORE: 0.77

## 2025-03-11 NOTE — PROGRESS NOTES
Verbal consent was acquired by the patient to use [employer] ambient listening note generation during this visit     Subjective:     CC: Diagnoses of Papanicolaou smear of cervix with positive high risk human papilloma virus (HPV) test, High grade squamous intraepithelial lesion (HGSIL), grade 3 SILVIANO, on biopsy of cervix, Abnormal menses, Painful menstrual periods, Stress incontinence of urine, Need for vaccination, and Dizziness were pertinent to this visit.    HPI:   Lisa presents today with    History of Present Illness  The patient presents for evaluation of heavy menstrual bleeding, urinary incontinence, and vertigo.    She reports an improvement in her overall health status. She has been experiencing alterations in her menstrual cycle, characterized by increased pain, heaviness, thickness, and a distinct dark blood odor. These changes have been observed since undergoing a LEEP procedure in 09/2023. She is currently seeking a gynecologist for further evaluation. Additionally, she reports lower back pain and cramping during her menstrual period.    She also reports difficulty in maintaining urinary continence, which she attributes to stress. She has not yet conceived.    She has been experiencing severe vertigo, the cause of which remains unknown to her. She maintains a healthy diet, predominantly vegetarian with occasional sweets. The vertigo episodes are sporadic but severe, with a notable episode occurring during a hike last week. She also reports symptoms of car sickness and nausea during flights, which have developed over the past few years. She does not believe these symptoms are related to hydration issues as she consumes ample water. She has been supplementing with vitamin D3 and B12 since her last visit. She occasionally experiences a sensation of her eyes crossing when at rest and has not sought ophthalmological evaluation. She also reports the presence of floaters in her vision. She experiences  "nausea during her vertigo episodes but does not vomit. During flights, she is unable to move her head without experiencing dizziness, a symptom that is also present during car travel. She has been informed that these symptoms may be age-related. She has been advised to consult an ophthalmologist at [facility].    MEDICATIONS  Current: vitamin D3, B12    IMMUNIZATIONS  She received the first dose of the chickenpox vaccine at her last visit.    Current Outpatient Medications   Medication Sig Dispense Refill    meclizine (ANTIVERT) 12.5 MG Tab Take 1 Tablet by mouth 3 times a day as needed for Dizziness. 30 Tablet 0     No current facility-administered medications for this visit.       Problem   Dizziness       ROS:  Review of Systems   Gastrointestinal:  Positive for abdominal pain (with menses).   Genitourinary:         Heavy, painful periods s/p leep procedure 2/2024  Urinary incontinence, stress induced   Neurological:  Positive for dizziness.   All other systems reviewed and are negative.      Objective:     Exam:  /62 (BP Location: Left arm, Patient Position: Sitting, BP Cuff Size: Adult)   Pulse 88   Temp 36.8 °C (98.2 °F) (Temporal)   Resp 20   Ht 1.727 m (5' 8\")   Wt 83.9 kg (185 lb)   SpO2 97%   BMI 28.13 kg/m²  Body mass index is 28.13 kg/m².    Physical Exam  Vitals reviewed.   Constitutional:       General: She is not in acute distress.     Appearance: Normal appearance. She is not ill-appearing.   HENT:      Head: Normocephalic and atraumatic.   Eyes:      Extraocular Movements: Extraocular movements intact.      Right eye: Normal extraocular motion and no nystagmus.      Left eye: Normal extraocular motion and no nystagmus.      Pupils: Pupils are equal, round, and reactive to light.   Cardiovascular:      Rate and Rhythm: Normal rate and regular rhythm.      Pulses: Normal pulses.      Heart sounds: Normal heart sounds.   Pulmonary:      Effort: Pulmonary effort is normal. No " respiratory distress.   Abdominal:      General: Bowel sounds are normal.      Palpations: Abdomen is soft.   Skin:     General: Skin is warm and dry.      Findings: No rash.   Neurological:      General: No focal deficit present.      Mental Status: She is alert and oriented to person, place, and time.      Cranial Nerves: No cranial nerve deficit.   Psychiatric:         Mood and Affect: Mood normal.         Behavior: Behavior normal.       Assessment & Plan:     34 y.o. female with the following -     Assessment & Plan  1. Menorrhagia.  Her uterus remains unchanged, but there is evidence of scarring on the cervix, likely a consequence of the LEEP procedure. She reports increased pain, heavier flow, and a dark blood smell during her periods. A referral to a gynecologist, specifically VALERIA Medina, has been initiated for further evaluation and management.    2. Urinary incontinence.  She reports difficulty holding urine, which may be related to weakened pelvic floor muscles. A referral for physical therapy has been made to strengthen her pelvic floor muscles. She is advised to attend at least one or two sessions to learn exercises that can be done at home.    3. Vertigo.  Her symptoms appear to be triggered by specific movements and changes in altitude. Over-the-counter medications such as Dramamine or meclizine have been recommended for use during travel. She has been informed about the potential side effects, including dry mouth and drowsiness. She has been advised to maintain adequate hydration. A visual acuity test will be conducted today. She has been advised to consult an ophthalmologist for further evaluation.    4. Health maintenance.  She will receive the second and final dose of the chickenpox vaccine today.    Follow-up  The patient will follow up in 1 year for routine wellness or sooner if any issues arise.    PROCEDURE  The patient underwent a LEEP procedure in 09/2023.    Problem List Items  Addressed This Visit       Papanicolaou smear of cervix with positive high risk human papilloma virus (HPV) test    Relevant Orders    Referral to Gynecology    High grade squamous intraepithelial lesion (HGSIL), grade 3 SILVIANO, on biopsy of cervix    Relevant Orders    Referral to Gynecology    Dizziness    Chronic, intermittent over the past couple years. Occurs generally with activity, with change in altitude.   Intermittent nausea associated with this.  Reports increased symptoms with travel- vehicle/plane           Relevant Medications    meclizine (ANTIVERT) 12.5 MG Tab     Other Visit Diagnoses         Abnormal menses        Relevant Orders    Referral to Gynecology      Painful menstrual periods        Relevant Orders    Referral to Gynecology      Stress incontinence of urine        Relevant Orders    Referral to Physical Therapy      Need for vaccination        Relevant Orders    Varicella Vaccine SQ (Completed)          Patient was educated in proper administration of medication(s) ordered today including safety, possible SE, risks, benefits, rationale and alternatives to therapy.   Supportive care, differential diagnoses, and indications for immediate follow-up discussed with patient.    Pathogenesis of diagnosis discussed including typical length and natural progression.    Instructed to return to clinic or nearest emergency department for any change in condition, further concerns, or worsening of symptoms.  Patient states understanding of the plan of care and discharge instructions.    Return in about 6 months (around 9/11/2025) for wellness.    Please note that this dictation was created using voice recognition software. I have made every reasonable attempt to correct obvious errors, but I expect that there are errors of grammar and possibly content that I did not discover before finalizing the note.

## 2025-03-11 NOTE — ASSESSMENT & PLAN NOTE
Chronic, intermittent over the past couple years. Occurs generally with activity, with change in altitude.   Intermittent nausea associated with this.  Reports increased symptoms with travel- vehicle/plane

## 2025-03-18 NOTE — Clinical Note
REFERRAL APPROVAL NOTICE         Sent on March 18, 2025                   Lisa Champion  [address]  [address]  [place] [address]                   Dear Ms. Champion,    After a careful review of the medical information and benefit coverage, [facility] has processed your referral. See below for additional details.    If applicable, you must be actively enrolled with your insurance for coverage of the authorized service. If you have any questions regarding your coverage, please contact your insurance directly.    REFERRAL INFORMATION   Referral #:  [id number]  Referred-To Provider    Referred-By Provider:  OB & Gyn - Gynecology    PHIL Tirado   [facility]      [address]  [address]  [place] [address]  [phone] [id number]  [place] [address]  [phone]    Referral Start Date:  03/11/2025  Referral End Date:   03/11/2026             SCHEDULING  If you do not already have an appointment, please call [phone] to make an appointment.     MORE INFORMATION  If you do not already have a [employer] account, sign up at: [email or web address]  You can access your medical information, make appointments, see lab results, billing information, and more.  If you have questions regarding this referral, please contact  the [facility] Referrals department at:             [phone]. Monday - Friday 8:00AM - 5:00PM.     Sincerely,    [facility]

## 2025-03-18 NOTE — Clinical Note
REFERRAL APPROVAL NOTICE         Sent on March 18, 2025                   Lisa Champion  [address]  [address]  [place] [address]                   Dear Ms. Champion,    After a careful review of the medical information and benefit coverage, [facility] has processed your referral. See below for additional details.    If applicable, you must be actively enrolled with your insurance for coverage of the authorized service. If you have any questions regarding your coverage, please contact your insurance directly.    REFERRAL INFORMATION   Referral #:  [id number]  Referred-To Provider    Referred-By Provider:  Physical Therapy    PHIL Tirado   [facility]      [address]  [address]  [place] [id number] [phone] [address]  [place] [address]  [phone]    Referral Start Date:  03/11/2025  Referral End Date:   03/11/2026             SCHEDULING  If you do not already have an appointment, please call [phone] to make an appointment.     MORE INFORMATION  If you do not already have a [email or web address] account, sign up at: [email or web address]  You can access your medical information, make appointments, see lab results, billing information, and more.  If you have questions regarding this referral, please contact  the [facility] Referrals department at:             [phone]. Monday - Friday 8:00AM - 5:00PM.     Sincerely,    [facility]

## 2025-05-29 ENCOUNTER — APPOINTMENT (OUTPATIENT)
Facility: MEDICAL CENTER | Age: 35
End: 2025-05-29
Payer: COMMERCIAL

## 2025-06-18 ENCOUNTER — APPOINTMENT (OUTPATIENT)
Facility: MEDICAL CENTER | Age: 35
End: 2025-06-18
Payer: COMMERCIAL

## 2025-06-18 LAB
25(OH)D3 SERPL-MCNC: 43 NG/ML (ref 30–100)
BASOPHILS # BLD AUTO: 0.9 % (ref 0–1.8)
BASOPHILS # BLD: 0.06 K/UL (ref 0–0.12)
EOSINOPHIL # BLD AUTO: 0.07 K/UL (ref 0–0.51)
EOSINOPHIL NFR BLD: 1.1 % (ref 0–6.9)
ERYTHROCYTE [DISTWIDTH] IN BLOOD BY AUTOMATED COUNT: 40.7 FL (ref 35.9–50)
EST. AVERAGE GLUCOSE BLD GHB EST-MCNC: 100 MG/DL
ESTRADIOL SERPL-MCNC: 73.2 PG/ML
FSH SERPL-ACNC: 4.1 MIU/ML
HBA1C MFR BLD: 5.1 % (ref 4–5.6)
HCT VFR BLD AUTO: 45.4 % (ref 37–47)
HGB BLD-MCNC: 14.9 G/DL (ref 12–16)
IMM GRANULOCYTES # BLD AUTO: 0.01 K/UL (ref 0–0.11)
IMM GRANULOCYTES NFR BLD AUTO: 0.2 % (ref 0–0.9)
LH SERPL-ACNC: 10.9 IU/L
LYMPHOCYTES # BLD AUTO: 1.27 K/UL (ref 1–4.8)
LYMPHOCYTES NFR BLD: 19.3 % (ref 22–41)
MCH RBC QN AUTO: 31 PG (ref 27–33)
MCHC RBC AUTO-ENTMCNC: 32.8 G/DL (ref 32.2–35.5)
MCV RBC AUTO: 94.4 FL (ref 81.4–97.8)
MONOCYTES # BLD AUTO: 0.39 K/UL (ref 0–0.85)
MONOCYTES NFR BLD AUTO: 5.9 % (ref 0–13.4)
NEUTROPHILS # BLD AUTO: 4.78 K/UL (ref 1.82–7.42)
NEUTROPHILS NFR BLD: 72.6 % (ref 44–72)
NRBC # BLD AUTO: 0 K/UL
NRBC BLD-RTO: 0 /100 WBC (ref 0–0.2)
PLATELET # BLD AUTO: 253 K/UL (ref 164–446)
PMV BLD AUTO: 10.3 FL (ref 9–12.9)
PROGEST SERPL-MCNC: 3.17 NG/ML
PROLACTIN SERPL-MCNC: 17.9 NG/ML (ref 2.8–26)
RBC # BLD AUTO: 4.81 M/UL (ref 4.2–5.4)
T4 FREE SERPL-MCNC: 1.24 NG/DL (ref 0.93–1.7)
TSH SERPL-ACNC: 2.47 UIU/ML (ref 0.38–5.33)
WBC # BLD AUTO: 6.6 K/UL (ref 4.8–10.8)

## 2025-06-18 PROCEDURE — 83002 ASSAY OF GONADOTROPIN (LH): CPT

## 2025-06-18 PROCEDURE — 83036 HEMOGLOBIN GLYCOSYLATED A1C: CPT

## 2025-06-18 PROCEDURE — 84146 ASSAY OF PROLACTIN: CPT

## 2025-06-18 PROCEDURE — 84439 ASSAY OF FREE THYROXINE: CPT

## 2025-06-18 PROCEDURE — 84402 ASSAY OF FREE TESTOSTERONE: CPT

## 2025-06-18 PROCEDURE — 83516 IMMUNOASSAY NONANTIBODY: CPT

## 2025-06-18 PROCEDURE — 82670 ASSAY OF TOTAL ESTRADIOL: CPT

## 2025-06-18 PROCEDURE — 83001 ASSAY OF GONADOTROPIN (FSH): CPT

## 2025-06-18 PROCEDURE — 84443 ASSAY THYROID STIM HORMONE: CPT

## 2025-06-18 PROCEDURE — 82626 DEHYDROEPIANDROSTERONE: CPT

## 2025-06-18 PROCEDURE — 84403 ASSAY OF TOTAL TESTOSTERONE: CPT

## 2025-06-18 PROCEDURE — 84270 ASSAY OF SEX HORMONE GLOBUL: CPT

## 2025-06-18 PROCEDURE — 82306 VITAMIN D 25 HYDROXY: CPT

## 2025-06-18 PROCEDURE — 86235 NUCLEAR ANTIGEN ANTIBODY: CPT

## 2025-06-18 PROCEDURE — 84144 ASSAY OF PROGESTERONE: CPT

## 2025-06-18 PROCEDURE — 83498 ASY HYDROXYPROGESTERONE 17-D: CPT

## 2025-06-18 PROCEDURE — 82166 ASSAY ANTI-MULLERIAN HORM: CPT

## 2025-06-18 PROCEDURE — 36415 COLL VENOUS BLD VENIPUNCTURE: CPT

## 2025-06-18 PROCEDURE — 85025 COMPLETE CBC W/AUTO DIFF WBC: CPT

## 2025-06-20 LAB
CENTROMERE IGG TITR SER IF: 0 AU/ML (ref 0–40)
ENA JO1 AB TITR SER: 1 AU/ML (ref 0–40)
ENA SCL70 IGG SER QL: 0 AU/ML (ref 0–40)

## 2025-06-21 LAB
ENA SM IGG SER-ACNC: 1 AU/ML (ref 0–40)
ENA SS-A 60KD AB SER-ACNC: 0 AU/ML (ref 0–40)
ENA SS-A IGG SER QL: 1 AU/ML (ref 0–40)
ENA SS-B IGG SER IA-ACNC: 0 AU/ML (ref 0–40)
U1 SNRNP IGG SER QL: 2 UNITS (ref 0–19)

## 2025-06-22 LAB
17OHP SERPL-MCNC: 98.22 NG/DL
MIS SERPL-MCNC: 4.51 NG/ML (ref 0.18–11.71)

## 2025-06-23 LAB — CHROMATIN IGG SERPL-ACNC: 3 UNITS (ref 0–19)

## 2025-06-24 LAB
DHEA SERPL-MCNC: 16 NG/ML (ref 1.33–7.78)
SHBG SERPL-SCNC: 64 NMOL/L (ref 25–122)
TESTOST FREE SERPL-MCNC: 4.7 PG/ML (ref 1.3–9.2)
TESTOST SERPL-MCNC: 43 NG/DL (ref 9–55)